# Patient Record
Sex: MALE | Race: OTHER | URBAN - METROPOLITAN AREA
[De-identification: names, ages, dates, MRNs, and addresses within clinical notes are randomized per-mention and may not be internally consistent; named-entity substitution may affect disease eponyms.]

---

## 2017-04-18 ENCOUNTER — APPOINTMENT (RX ONLY)
Dept: URBAN - METROPOLITAN AREA CLINIC 46 | Facility: CLINIC | Age: 37
Setting detail: DERMATOLOGY
End: 2017-04-18

## 2017-04-18 DIAGNOSIS — Z41.9 ENCOUNTER FOR PROCEDURE FOR PURPOSES OTHER THAN REMEDYING HEALTH STATE, UNSPECIFIED: ICD-10-CM

## 2017-04-18 PROCEDURE — ? PRESCRIPTION

## 2017-04-18 PROCEDURE — ? COSMETIC FOLLOW-UP

## 2017-04-18 RX ORDER — CLINDAMYCIN PHOSPHATE 10 MG/ML
SOLUTION TOPICAL
Qty: 1 | Refills: 0 | Status: ERX | COMMUNITY
Start: 2017-04-18

## 2017-04-18 RX ORDER — DOXYCYCLINE HYCLATE 50 MG/1
CAPSULE, GELATIN COATED ORAL
Qty: 14 | Refills: 0 | Status: ERX | COMMUNITY
Start: 2017-04-18

## 2017-04-18 RX ADMIN — CLINDAMYCIN PHOSPHATE: 10 SOLUTION TOPICAL at 20:33

## 2017-04-18 RX ADMIN — DOXYCYCLINE HYCLATE: 50 CAPSULE, GELATIN COATED ORAL at 20:33

## 2017-04-18 NOTE — PROCEDURE: COSMETIC FOLLOW-UP
Comments (Free Text): Patient is being treated for folliculitis, Dr. Ignacio will re-evaluate in 2-3 weeks
Global Improvement: Very Good
Patient Satisfaction: Pleased
Detail Level: Zone

## 2017-04-18 NOTE — HPI: OTHER
Condition:: Neogract follow up
Please Describe Your Condition:: Patient states the previous surgery sites are stabbing, tender, with slight discomfort